# Patient Record
Sex: MALE | Race: WHITE | Employment: UNEMPLOYED | URBAN - METROPOLITAN AREA
[De-identification: names, ages, dates, MRNs, and addresses within clinical notes are randomized per-mention and may not be internally consistent; named-entity substitution may affect disease eponyms.]

---

## 2017-05-11 ENCOUNTER — ANESTHESIA EVENT (OUTPATIENT)
Dept: SURGERY | Age: 32
End: 2017-05-11
Payer: MEDICAID

## 2017-05-11 RX ORDER — ACETAMINOPHEN 500 MG
TABLET ORAL
COMMUNITY
End: 2017-05-15

## 2017-05-15 ENCOUNTER — HOSPITAL ENCOUNTER (OUTPATIENT)
Age: 32
Setting detail: OUTPATIENT SURGERY
Discharge: HOME | End: 2017-05-15
Attending: DENTIST | Admitting: DENTIST
Payer: MEDICAID

## 2017-05-15 ENCOUNTER — ANESTHESIA (OUTPATIENT)
Dept: SURGERY | Age: 32
End: 2017-05-15
Payer: MEDICAID

## 2017-05-15 VITALS
HEART RATE: 102 BPM | WEIGHT: 226 LBS | RESPIRATION RATE: 18 BRPM | DIASTOLIC BLOOD PRESSURE: 88 MMHG | TEMPERATURE: 97.4 F | HEIGHT: 69 IN | OXYGEN SATURATION: 95 % | SYSTOLIC BLOOD PRESSURE: 117 MMHG | BODY MASS INDEX: 33.47 KG/M2

## 2017-05-15 PROCEDURE — 77030020268 HC MISC GENERAL SUPPLY: Performed by: DENTIST

## 2017-05-15 PROCEDURE — 76060000032 HC ANESTHESIA 0.5 TO 1 HR: Performed by: DENTIST

## 2017-05-15 PROCEDURE — 74011250636 HC RX REV CODE- 250/636

## 2017-05-15 PROCEDURE — 74011000250 HC RX REV CODE- 250: Performed by: DENTIST

## 2017-05-15 PROCEDURE — 77030011640 HC PAD GRND REM COVD -A: Performed by: DENTIST

## 2017-05-15 PROCEDURE — 76210000006 HC OR PH I REC 0.5 TO 1 HR: Performed by: DENTIST

## 2017-05-15 PROCEDURE — 76010000138 HC OR TIME 0.5 TO 1 HR: Performed by: DENTIST

## 2017-05-15 PROCEDURE — 77030008683 HC TU ET CUF COVD -A: Performed by: ANESTHESIOLOGY

## 2017-05-15 PROCEDURE — 77030031139 HC SUT VCRL2 J&J -A: Performed by: DENTIST

## 2017-05-15 PROCEDURE — 77030032490 HC SLV COMPR SCD KNE COVD -B: Performed by: DENTIST

## 2017-05-15 PROCEDURE — 74011250637 HC RX REV CODE- 250/637: Performed by: DENTIST

## 2017-05-15 PROCEDURE — 77030018842 HC SOL IRR SOD CL 9% BAXT -A: Performed by: DENTIST

## 2017-05-15 PROCEDURE — 74011250637 HC RX REV CODE- 250/637: Performed by: NURSE ANESTHETIST, CERTIFIED REGISTERED

## 2017-05-15 PROCEDURE — 74011000250 HC RX REV CODE- 250

## 2017-05-15 PROCEDURE — 76210000021 HC REC RM PH II 0.5 TO 1 HR: Performed by: DENTIST

## 2017-05-15 PROCEDURE — 74011250636 HC RX REV CODE- 250/636: Performed by: NURSE ANESTHETIST, CERTIFIED REGISTERED

## 2017-05-15 PROCEDURE — 74011000272 HC RX REV CODE- 272: Performed by: DENTIST

## 2017-05-15 RX ORDER — CHLORHEXIDINE GLUCONATE 20 %
2 SOLUTION, NON-ORAL MISCELLANEOUS 3 TIMES DAILY
Qty: 100 ML | Refills: 1 | Status: SHIPPED | OUTPATIENT
Start: 2017-05-15

## 2017-05-15 RX ORDER — LIDOCAINE HYDROCHLORIDE 20 MG/ML
INJECTION, SOLUTION EPIDURAL; INFILTRATION; INTRACAUDAL; PERINEURAL AS NEEDED
Status: DISCONTINUED | OUTPATIENT
Start: 2017-05-15 | End: 2017-05-15 | Stop reason: HOSPADM

## 2017-05-15 RX ORDER — HYDROCODONE BITARTRATE AND ACETAMINOPHEN 7.5; 325 MG/1; MG/1
1 TABLET ORAL
Qty: 30 TAB | Refills: 0 | Status: SHIPPED | OUTPATIENT
Start: 2017-05-15

## 2017-05-15 RX ORDER — AMOXICILLIN 500 MG/1
500 CAPSULE ORAL 3 TIMES DAILY
Qty: 21 CAP | Refills: 0 | Status: SHIPPED | OUTPATIENT
Start: 2017-05-15 | End: 2017-05-22

## 2017-05-15 RX ORDER — FENTANYL CITRATE 50 UG/ML
INJECTION, SOLUTION INTRAMUSCULAR; INTRAVENOUS AS NEEDED
Status: DISCONTINUED | OUTPATIENT
Start: 2017-05-15 | End: 2017-05-15 | Stop reason: HOSPADM

## 2017-05-15 RX ORDER — PROPOFOL 10 MG/ML
INJECTION, EMULSION INTRAVENOUS AS NEEDED
Status: DISCONTINUED | OUTPATIENT
Start: 2017-05-15 | End: 2017-05-15 | Stop reason: HOSPADM

## 2017-05-15 RX ORDER — SUCCINYLCHOLINE CHLORIDE 20 MG/ML
INJECTION INTRAMUSCULAR; INTRAVENOUS AS NEEDED
Status: DISCONTINUED | OUTPATIENT
Start: 2017-05-15 | End: 2017-05-15 | Stop reason: HOSPADM

## 2017-05-15 RX ORDER — FENTANYL CITRATE 50 UG/ML
INJECTION, SOLUTION INTRAMUSCULAR; INTRAVENOUS
Status: DISCONTINUED
Start: 2017-05-15 | End: 2017-05-15 | Stop reason: HOSPADM

## 2017-05-15 RX ORDER — IBUPROFEN 200 MG
600 TABLET ORAL
Qty: 30 TAB | Refills: 0 | Status: SHIPPED | OUTPATIENT
Start: 2017-05-15

## 2017-05-15 RX ORDER — SODIUM CHLORIDE, SODIUM LACTATE, POTASSIUM CHLORIDE, CALCIUM CHLORIDE 600; 310; 30; 20 MG/100ML; MG/100ML; MG/100ML; MG/100ML
75 INJECTION, SOLUTION INTRAVENOUS CONTINUOUS
Status: DISCONTINUED | OUTPATIENT
Start: 2017-05-15 | End: 2017-05-15 | Stop reason: HOSPADM

## 2017-05-15 RX ORDER — CEFAZOLIN SODIUM IN 0.9 % NACL 2 G/100 ML
PLASTIC BAG, INJECTION (ML) INTRAVENOUS AS NEEDED
Status: DISCONTINUED | OUTPATIENT
Start: 2017-05-15 | End: 2017-05-15 | Stop reason: HOSPADM

## 2017-05-15 RX ORDER — ONDANSETRON 2 MG/ML
INJECTION INTRAMUSCULAR; INTRAVENOUS AS NEEDED
Status: DISCONTINUED | OUTPATIENT
Start: 2017-05-15 | End: 2017-05-15 | Stop reason: HOSPADM

## 2017-05-15 RX ORDER — DIPHENHYDRAMINE HYDROCHLORIDE 50 MG/ML
25 INJECTION, SOLUTION INTRAMUSCULAR; INTRAVENOUS
Status: DISCONTINUED | OUTPATIENT
Start: 2017-05-15 | End: 2017-05-15 | Stop reason: HOSPADM

## 2017-05-15 RX ORDER — LIDOCAINE HYDROCHLORIDE 10 MG/ML
0.1 INJECTION, SOLUTION EPIDURAL; INFILTRATION; INTRACAUDAL; PERINEURAL AS NEEDED
Status: DISCONTINUED | OUTPATIENT
Start: 2017-05-15 | End: 2017-05-15 | Stop reason: HOSPADM

## 2017-05-15 RX ORDER — MIDAZOLAM HYDROCHLORIDE 1 MG/ML
INJECTION, SOLUTION INTRAMUSCULAR; INTRAVENOUS AS NEEDED
Status: DISCONTINUED | OUTPATIENT
Start: 2017-05-15 | End: 2017-05-15 | Stop reason: HOSPADM

## 2017-05-15 RX ORDER — DEXAMETHASONE SODIUM PHOSPHATE 4 MG/ML
INJECTION, SOLUTION INTRA-ARTICULAR; INTRALESIONAL; INTRAMUSCULAR; INTRAVENOUS; SOFT TISSUE AS NEEDED
Status: DISCONTINUED | OUTPATIENT
Start: 2017-05-15 | End: 2017-05-15 | Stop reason: HOSPADM

## 2017-05-15 RX ORDER — IBUPROFEN 200 MG
TABLET ORAL
COMMUNITY
End: 2017-05-15

## 2017-05-15 RX ORDER — ROCURONIUM BROMIDE 10 MG/ML
INJECTION, SOLUTION INTRAVENOUS AS NEEDED
Status: DISCONTINUED | OUTPATIENT
Start: 2017-05-15 | End: 2017-05-15 | Stop reason: HOSPADM

## 2017-05-15 RX ORDER — KETOROLAC TROMETHAMINE 30 MG/ML
INJECTION, SOLUTION INTRAMUSCULAR; INTRAVENOUS AS NEEDED
Status: DISCONTINUED | OUTPATIENT
Start: 2017-05-15 | End: 2017-05-15 | Stop reason: HOSPADM

## 2017-05-15 RX ORDER — FENTANYL CITRATE 50 UG/ML
50 INJECTION, SOLUTION INTRAMUSCULAR; INTRAVENOUS AS NEEDED
Status: DISCONTINUED | OUTPATIENT
Start: 2017-05-15 | End: 2017-05-15 | Stop reason: HOSPADM

## 2017-05-15 RX ORDER — CHLORHEXIDINE GLUCONATE 1.2 MG/ML
RINSE ORAL AS NEEDED
Status: DISCONTINUED | OUTPATIENT
Start: 2017-05-15 | End: 2017-05-15 | Stop reason: HOSPADM

## 2017-05-15 RX ORDER — HYDROCODONE BITARTRATE AND ACETAMINOPHEN 5; 325 MG/1; MG/1
1 TABLET ORAL ONCE
Status: COMPLETED | OUTPATIENT
Start: 2017-05-15 | End: 2017-05-15

## 2017-05-15 RX ADMIN — KETOROLAC TROMETHAMINE 30 MG: 30 INJECTION, SOLUTION INTRAMUSCULAR; INTRAVENOUS at 10:21

## 2017-05-15 RX ADMIN — SUCCINYLCHOLINE CHLORIDE 100 MG: 20 INJECTION INTRAMUSCULAR; INTRAVENOUS at 10:11

## 2017-05-15 RX ADMIN — FENTANYL CITRATE 50 MCG: 50 INJECTION, SOLUTION INTRAMUSCULAR; INTRAVENOUS at 11:02

## 2017-05-15 RX ADMIN — ONDANSETRON 4 MG: 2 INJECTION INTRAMUSCULAR; INTRAVENOUS at 10:21

## 2017-05-15 RX ADMIN — HYDROCODONE BITARTRATE AND ACETAMINOPHEN 1 TABLET: 5; 325 TABLET ORAL at 12:14

## 2017-05-15 RX ADMIN — Medication 2 G: at 10:06

## 2017-05-15 RX ADMIN — ROCURONIUM BROMIDE 10 MG: 10 INJECTION, SOLUTION INTRAVENOUS at 10:11

## 2017-05-15 RX ADMIN — FENTANYL CITRATE 100 MCG: 50 INJECTION, SOLUTION INTRAMUSCULAR; INTRAVENOUS at 10:07

## 2017-05-15 RX ADMIN — LIDOCAINE HYDROCHLORIDE 60 MG: 20 INJECTION, SOLUTION EPIDURAL; INFILTRATION; INTRACAUDAL; PERINEURAL at 10:07

## 2017-05-15 RX ADMIN — PROPOFOL 150 MG: 10 INJECTION, EMULSION INTRAVENOUS at 10:11

## 2017-05-15 RX ADMIN — DEXAMETHASONE SODIUM PHOSPHATE 4 MG: 4 INJECTION, SOLUTION INTRA-ARTICULAR; INTRALESIONAL; INTRAMUSCULAR; INTRAVENOUS; SOFT TISSUE at 10:21

## 2017-05-15 RX ADMIN — MIDAZOLAM HYDROCHLORIDE 2 MG: 1 INJECTION, SOLUTION INTRAMUSCULAR; INTRAVENOUS at 10:02

## 2017-05-15 RX ADMIN — SODIUM CHLORIDE, SODIUM LACTATE, POTASSIUM CHLORIDE, AND CALCIUM CHLORIDE 75 ML/HR: 600; 310; 30; 20 INJECTION, SOLUTION INTRAVENOUS at 09:22

## 2017-05-15 NOTE — ANESTHESIA PREPROCEDURE EVALUATION
Anesthetic History   No history of anesthetic complications            Review of Systems / Medical History  Patient summary reviewed and pertinent labs reviewed    Pulmonary  Within defined limits                 Neuro/Psych         Psychiatric history     Cardiovascular  Within defined limits                Exercise tolerance: >4 METS     GI/Hepatic/Renal     GERD: well controlled           Endo/Other  Within defined limits           Other Findings   Comments:   Risk Factors for Postoperative nausea/vomiting:       History of postoperative nausea/vomiting? NO       Female? NO       Motion sickness? NO       Intended opioid administration for postoperative analgesia? YES      Smoking Abstinence  Current Smoker? NO  Elective Surgery? YES  Seen preoperatively by anesthesiologist or proxy prior to day of surgery? YES  Pt abstained from smoking 24 hours prior to anesthesia?  N/A           Physical Exam    Airway  Mallampati: III  TM Distance: 4 - 6 cm  Neck ROM: normal range of motion   Mouth opening: Diminished (comment)     Cardiovascular    Rhythm: regular  Rate: normal         Dental    Dentition: Poor dentition     Pulmonary  Breath sounds clear to auscultation               Abdominal  GI exam deferred       Other Findings            Anesthetic Plan    ASA: 3  Anesthesia type: general          Induction: Intravenous  Anesthetic plan and risks discussed with: Patient and Mother

## 2017-05-15 NOTE — IP AVS SNAPSHOT
[address] [address] [place] Pratima Holly 
[phone] Patient: Tahmina Marc MRN: [id number] :1985 You are allergic to the following Allergen Reactions Bee Sting (Sting, Bee) Swelling Recent Documentation Height Weight BMI Smoking Status 1.753 m 102.5 kg 33.37 kg/m2 Never Smoker Emergency Contacts Name Discharge Info Relation Home Work Mobile Klaudia Jean-Baptiste 56 CAREGIVER [3] Parent [1] [phone] About your hospitalization You were admitted on:  May 15, 2017 You last received care in the:  [facility] PACU You were discharged on:  May 15, 2017 Unit phone number:  [phone] Why you were hospitalized Your primary diagnosis was:  Not on File Providers Seen During Your Hospitalizations Provider Role Specialty Primary office phone Juan Vega DDS Attending Provider Oral Surgery [phone] Your Primary Care Physician (PCP) Primary Care Physician Office Phone Office Fax [facility] [phone] Follow-up Information Follow up With Details Comments Contact Info Klaus Torres MD   [address] [place] [address] [phone] Current Discharge Medication List  
  
START taking these medications Dose & Instructions Dispensing Information Comments Morning Noon Evening Bedtime  
 amoxicillin 500 mg capsule Commonly known as:  AMOXIL Your last dose was: Your next dose is:    
   
   
 Dose:  500 mg Take 1 Cap by mouth three (3) times daily for 7 days. Quantity:  21 Cap Refills:  0  
     
   
   
   
  
 chlorhexidine gluconate 20 % Soln liquid Commonly known as:  HIBICLENS Your last dose was: Your next dose is:    
   
   
 Dose:  2 mL  
2 mL by Irrigation route three (3) times daily.  Indications: swish for 2 mins after meals then spit. Quantity:  100 mL Refills:  1 HYDROcodone-acetaminophen 7.5-325 mg per tablet Commonly known as:  Zay Brittle Your last dose was: Your next dose is:    
   
   
 Dose:  1 Tab Take 1 Tab by mouth every six (6) hours as needed for Pain. Max Daily Amount: 4 Tabs. Quantity:  30 Tab Refills:  0 CONTINUE these medications which have CHANGED Dose & Instructions Dispensing Information Comments Morning Noon Evening Bedtime  
 ibuprofen 200 mg tablet Commonly known as:  MOTRIN What changed:   
- how much to take - when to take this 
- reasons to take this Your last dose was: Your next dose is:    
   
   
 Dose:  600 mg Take 3 Tabs by mouth every six (6) hours as needed for Pain. Quantity:  30 Tab Refills:  0 CONTINUE these medications which have NOT CHANGED Dose & Instructions Dispensing Information Comments Morning Noon Evening Bedtime MECLIZINE PO Your last dose was: Your next dose is: Take  by mouth. Refills:  0 STOP taking these medications TYLENOL EXTRA STRENGTH 500 mg tablet Generic drug:  acetaminophen Where to Get Your Medications Information on where to get these meds will be given to you by the nurse or doctor. ! Ask your nurse or doctor about these medications  
  amoxicillin 500 mg capsule  
 chlorhexidine gluconate 20 % Soln liquid HYDROcodone-acetaminophen 7.5-325 mg per tablet  
 ibuprofen 200 mg tablet Discharge Instructions Dental Surgery: What to Expect at [facility] Your Recovery Dental surgery includes procedures such as tooth extractions, root canals, gum surgery, and dental implants. Your procedure may be done by: · A dentist. 
· An oral surgeon. · An endodontist, for root canals. · A periodontist, for gum surgery. You may have some pain, bleeding, or swelling afterward, depending on the procedure. You may get medicine for pain. The pain should improve steadily after the surgery. This care sheet gives you a general idea about how long it will take for you to recover. But each person recovers at a different pace. Follow the steps below to get better as quickly as possible. How can you care for yourself at home? Activity · Allow the area to heal. Don't move quickly or lift anything heavy until you are feeling better. · Rest when you feel tired. · Your dentist may give you specific instructions on when you can do your normal activities again, such as driving and going back to work. Diet · Eat soft foods, such as gelatin, pudding, or a thin soup. Gradually add solid foods to your diet as you heal. You can eat solid foods again in about a week. · If you had a tooth pulled, don't use a straw for the first few days. Sucking on a straw can loosen the blood clot that forms at the surgery site. If this happens, it can delay healing. Medicines · Your doctor will tell you if and when you can restart your medicines. He or she will also give you instructions about taking any new medicines. · If you take blood thinners, such as warfarin (Coumadin), clopidogrel (Plavix), or aspirin, be sure to talk to your doctor. He or she will tell you if and when to start taking those medicines again. Make sure that you understand exactly what your doctor wants you to do. · Be safe with medicines. Read and follow all instructions on the label. ¨ If the dentist gave you a prescription medicine for pain, take it as prescribed. ¨ If you are not taking a prescription pain medicine, ask your dentist if you can take an over-the-counter medicine. · If your dentist prescribed antibiotics, take them as directed. Do not stop taking them just because you feel better. You need to take the full course of antibiotics. Incision care · While your mouth is numb, be careful not to bite your tongue or the inside of your cheek or lip. · If you had a tooth pulled, bite gently on a gauze pad now and then. Change the pad as it becomes soaked with blood. Call your dentist or oral surgeon if you still have bleeding 24 hours after your surgery. · If you had stitches in your gums, your dentist will tell you if and when you need to come back to have them removed. · Starting 24 hours after your tooth was pulled, gently rinse your mouth with warm salt water several times a day to reduce swelling and relieve pain. · Continue to brush your teeth and tongue carefully. Floss when your dentist says you can. Ice and heat · If needed, put ice or a cold pack on your cheek for 10 to 20 minutes at a time. Try to do this every 1 to 2 hours for the next 3 days (when you are awake) or until the swelling goes down. Put a thin cloth between the ice and your skin. Other instructions · Do not smoke for at least 24 hours after your surgery. Smoking can delay healing. Smoking also decreases the blood supply and can bring germs and contaminants to the mouth. Follow-up care is a key part of your treatment and safety. Be sure to make and go to all appointments, and call your dentist if you are having problems. It's also a good idea to know your test results and keep a list of the medicines you take. When should you call for help? Call 911 anytime you think you may need emergency care. For example, call if: 
· You passed out (lost consciousness). · You have severe trouble breathing. Call your dentist now or seek immediate medical care if: 
· You have pain that does not get better after you take pain medicine. · You have loose stitches, or your incision comes open. · You have new or more bleeding from the site. · You have signs of infection, such as: 
¨ Increased pain, swelling, warmth, or redness. ¨ Pus draining from the incision or socket. ¨ A fever. Watch closely for changes in your health, and be sure to contact your dentist if you have any problems. Where can you learn more? Go to http://jocelyn-ottoniel.info/. Enter X035 in the search box to learn more about \"Dental Surgery: What to Expect at Home. \" Current as of: August 9, 2016 Content Version: 11.2 © 1949-4871 [employer]. Care instructions adapted under license by [employer] (which disclaims liability or warranty for this information). If you have questions about a medical condition or this instruction, always ask your healthcare professional. [address] [address] any warranty or liability for your use of this information. DISCHARGE SUMMARY from Nurse The following personal items are in your possession at time of discharge: 
 
Dental Appliances: None Visual Aid: Glasses Jewelry: None Clothing: Shirt, Socks, Footwear, Undergarments, Pants Other Valuables: Eyeglasses (everything given to mom) PATIENT INSTRUCTIONS: 
 
 
F-face looks uneven A-arms unable to move or move unevenly S-speech slurred or non-existent T-time-call 911 as soon as signs and symptoms begin-DO NOT go Back to bed or wait to see if you get better-TIME IS BRAIN. Warning Signs of HEART ATTACK Call 911 if you have these symptoms: 
? Chest discomfort. Most heart attacks involve discomfort in the center of the chest that lasts more than a few minutes, or that goes away and comes back. It can feel like uncomfortable pressure, squeezing, fullness, or pain. ? Discomfort in other areas of the upper body. Symptoms can include pain or discomfort in one or both arms, the back, neck, jaw, or stomach. ? Shortness of breath with or without chest discomfort. ? Other signs may include breaking out in a cold sweat, nausea, or lightheadedness. Don't wait more than five minutes to call [address]! Fast action can save your life. Calling 911 is almost always the fastest way to get lifesaving treatment. Emergency Medical Services staff can begin treatment when they arrive  up to an hour sooner than if someone gets to the hospital by car. The discharge information has been reviewed with the patient and parent. The patient and parent verbalized understanding. Discharge medications reviewed with the patient and parent and appropriate educational materials and side effects teaching were provided. Patient armband removed and given to patient to take home. Patient was informed of the privacy risks if armband lost or stolen Discharge Orders None Introducing \[facility]\"" & [facility] SERVICES! Shadi Nassar introduces [employer] patient portal. Now you can access parts of your medical record, email your doctor's office, and request medication refills online. 1. In your internet browser, go to [email or web address] 2. Click on the First Time User? Click Here link in the Sign In box. You will see the New Member Sign Up page. 3. Enter your MongoDB Access Code exactly as it appears below. You will not need to use this code after youve completed the sign-up process. If you do not sign up before the expiration date, you must request a new code. · [employer] Access Code: [email or web address] Expires: 8/13/2017  7:03 AM 
 
4. Enter the last four digits of your Social Security Number (xxxx) and Date of Birth (mm/dd/yyyy) as indicated and click Submit. You will be taken to the next sign-up page. 5. Create a Bolt.iot ID. This will be your MongoDB login ID and cannot be changed, so think of one that is secure and easy to remember. 6. Create a Bolt.iot password. You can change your password at any time. 7. Enter your Password Reset Question and Answer. This can be used at a later time if you forget your password. 8. Enter your e-mail address. You will receive e-mail notification when new information is available in [address] 9. Click Sign Up. You can now view and download portions of your medical record. 10. Click the Download Summary menu link to download a portable copy of your medical information. If you have questions, please visit the Frequently Asked Questions section of the Watsin website. Remember, W[employer]in is NOT to be used for urgent needs. For medical emergencies, dial 911. Now available from your iPhone and Android! General Information Please provide this summary of care documentation to your next provider. Patient Signature:  ____________________________________________________________ Date:  ____________________________________________________________  
  
[facility] Provider Signature:  ____________________________________________________________ Date:  ____________________________________________________________

## 2017-05-15 NOTE — ANESTHESIA POSTPROCEDURE EVALUATION
Post-Anesthesia Evaluation and Assessment    Patient: Yayo Ortiz MRN: [id number]  SSN: [id number]    YOB: 1985  Age: 34 y.o. Sex: male      Data from PACU flowsheet    Cardiovascular Function/Vital Signs  Visit Vitals    BP (!) 134/94    Pulse (!) 108    Temp 36.3 °C (97.4 °F)    Resp 21    Ht 5' 9\" (1.753 m)    Wt 102.5 kg (226 lb)    SpO2 92%    BMI 33.37 kg/m2       Patient is status post general anesthesia for Procedure(s):  extract 5,32,98,58. Nausea/Vomiting: controlled    Postoperative hydration reviewed and adequate. Pain:  Pain Scale 1: Numeric (0 - 10) (05/15/17 1113)  Pain Intensity 1: 0 (05/15/17 1113)   Managed      Mental Status and Level of Consciousness: Alert and oriented     Pulmonary Status:   O2 Device: Room air (05/15/17 1102)   Adequate oxygenation and airway patent    Complications related to anesthesia: None    Post-anesthesia assessment completed.  No concerns    Signed By: Sandeep Hope CRNA     May 15, 2017

## 2017-05-15 NOTE — H&P
H&p reviewed. Pt examined. No changes. Planning for 4 extractions under GA. Consent signed.  Dr Jose R Lanza

## 2017-05-15 NOTE — DISCHARGE INSTRUCTIONS
Dental Surgery: What to Expect at [address] [place] [address] surgery includes procedures such as tooth extractions, root canals, gum surgery, and dental implants. Your procedure may be done by:  · A dentist.  · An oral surgeon. · An endodontist, for root canals. · A periodontist, for gum surgery. You may have some pain, bleeding, or swelling afterward, depending on the procedure. You may get medicine for pain. The pain should improve steadily after the surgery. This care sheet gives you a general idea about how long it will take for you to recover. But each person recovers at a different pace. Follow the steps below to get better as quickly as possible. How can you care for yourself at home? Activity  · Allow the area to heal. Don't move quickly or lift anything heavy until you are feeling better. · Rest when you feel tired. · Your dentist may give you specific instructions on when you can do your normal activities again, such as driving and going back to work. Diet  · Eat soft foods, such as gelatin, pudding, or a thin soup. Gradually add solid foods to your diet as you heal. You can eat solid foods again in about a week. · If you had a tooth pulled, don't use a straw for the first few days. Sucking on a straw can loosen the blood clot that forms at the surgery site. If this happens, it can delay healing. Medicines  · Your doctor will tell you if and when you can restart your medicines. He or she will also give you instructions about taking any new medicines. · If you take blood thinners, such as warfarin (Coumadin), clopidogrel (Plavix), or aspirin, be sure to talk to your doctor. He or she will tell you if and when to start taking those medicines again. Make sure that you understand exactly what your doctor wants you to do. · Be safe with medicines. Read and follow all instructions on the label. ¨ If the dentist gave you a prescription medicine for pain, take it as prescribed.   ¨ If you are not taking a prescription pain medicine, ask your dentist if you can take an over-the-counter medicine. · If your dentist prescribed antibiotics, take them as directed. Do not stop taking them just because you feel better. You need to take the full course of antibiotics. Incision care  · While your mouth is numb, be careful not to bite your tongue or the inside of your cheek or lip. · If you had a tooth pulled, bite gently on a gauze pad now and then. Change the pad as it becomes soaked with blood. Call your dentist or oral surgeon if you still have bleeding 24 hours after your surgery. · If you had stitches in your gums, your dentist will tell you if and when you need to come back to have them removed. · Starting 24 hours after your tooth was pulled, gently rinse your mouth with warm salt water several times a day to reduce swelling and relieve pain. · Continue to brush your teeth and tongue carefully. Floss when your dentist says you can. Ice and heat  · If needed, put ice or a cold pack on your cheek for 10 to 20 minutes at a time. Try to do this every 1 to 2 hours for the next 3 days (when you are awake) or until the swelling goes down. Put a thin cloth between the ice and your skin. Other instructions  · Do not smoke for at least 24 hours after your surgery. Smoking can delay healing. Smoking also decreases the blood supply and can bring germs and contaminants to the mouth. Follow-up care is a key part of your treatment and safety. Be sure to make and go to all appointments, and call your dentist if you are having problems. It's also a good idea to know your test results and keep a list of the medicines you take. When should you call for help? Call 911 anytime you think you may need emergency care. For example, call if:  · You passed out (lost consciousness). · You have severe trouble breathing.   Call your dentist now or seek immediate medical care if:  · You have pain that does not get better after you take pain medicine. · You have loose stitches, or your incision comes open. · You have new or more bleeding from the site. · You have signs of infection, such as:  ¨ Increased pain, swelling, warmth, or redness. ¨ Pus draining from the incision or socket. ¨ A fever. Watch closely for changes in your health, and be sure to contact your dentist if you have any problems. Where can you learn more? Go to http://jocelyn-ottoniel.info/. Enter T576 in the search box to learn more about \"Dental Surgery: What to Expect at Home. \"  Current as of: August 9, 2016  Content Version: 11.2  © 8566-5764 [employer]. Care instructions adapted under license by [employer] (which disclaims liability or warranty for this information). If you have questions about a medical condition or this instruction, always ask your healthcare professional. [address] [address] any warranty or liability for your use of this information. DISCHARGE SUMMARY from Nurse    The following personal items are in your possession at time of discharge:    Dental Appliances: None  Visual Aid: Glasses        Jewelry: None  Clothing: Shirt, Socks, Footwear, Undergarments, Pants  Other Valuables: Eyeglasses (everything given to mom)             PATIENT INSTRUCTIONS:    After general anesthesia or intravenous sedation, for 24 hours or while taking prescription Narcotics:  · Limit your activities  · Do not drive and operate hazardous machinery  · Do not make important personal or business decisions  · Do  not drink alcoholic beverages  · If you have not urinated within 8 hours after discharge, please contact your surgeon on call.     Report the following to your surgeon:  · Excessive pain, swelling, redness or odor of or around the surgical area  · Temperature over 100.5  · Nausea and vomiting lasting longer than 4 hours or if unable to take medications  · Any signs of decreased circulation or nerve impairment to extremity: change in color, persistent  numbness, tingling, coldness or increase pain  · Any questions        What to do at Home:  Recommended activity: Activity as tolerated and no driving for today. *  Please give a list of your current medications to your Primary Care Provider. *  Please update this list whenever your medications are discontinued, doses are      changed, or new medications (including over-the-counter products) are added. *  Please carry medication information at all times in case of emergency situations. These are general instructions for a healthy lifestyle:    No smoking/ No tobacco products/ Avoid exposure to second hand smoke    Surgeon General's Warning:  Quitting smoking now greatly reduces serious risk to your health. Obesity, smoking, and sedentary lifestyle greatly increases your risk for illness    A healthy diet, regular physical exercise & weight monitoring are important for maintaining a healthy lifestyle    You may be retaining fluid if you have a history of heart failure or if you experience any of the following symptoms:  Weight gain of 3 pounds or more overnight or 5 pounds in a week, increased swelling in our hands or feet or shortness of breath while lying flat in bed. Please call your doctor as soon as you notice any of these symptoms; do not wait until your next office visit. Recognize signs and symptoms of STROKE:    F-face looks uneven    A-arms unable to move or move unevenly    S-speech slurred or non-existent    T-time-call 911 as soon as signs and symptoms begin-DO NOT go       Back to bed or wait to see if you get better-TIME IS BRAIN. Warning Signs of HEART ATTACK     Call 911 if you have these symptoms:   Chest discomfort. Most heart attacks involve discomfort in the center of the chest that lasts more than a few minutes, or that goes away and comes back.  It can feel like uncomfortable pressure, squeezing, fullness, or pain.   Discomfort in other areas of the upper body. Symptoms can include pain or discomfort in one or both arms, the back, neck, jaw, or stomach.  Shortness of breath with or without chest discomfort.  Other signs may include breaking out in a cold sweat, nausea, or lightheadedness. Don't wait more than five minutes to call 911 - MINUTES MATTER! Fast action can save your life. Calling 911 is almost always the fastest way to get lifesaving treatment. Emergency Medical Services staff can begin treatment when they arrive -- up to an hour sooner than if someone gets to the hospital by car. The discharge information has been reviewed with the patient and parent. The patient and parent verbalized understanding. Discharge medications reviewed with the patient and parent and appropriate educational materials and side effects teaching were provided. Patient armband removed and given to patient to take home.   Patient was informed of the privacy risks if armband lost or stolen

## 2017-05-15 NOTE — PERIOP NOTES
1052  Received pt. Connected pt to monitor. VSS. Assessment preformed. RN at bedside. Will continue to monitor. [facility] to waiting room, spoke to [facility] - pt mother - pt id number verified. Update given on pt status.

## 2017-05-15 NOTE — OP NOTES
OPERATIVE REPORT      Date:  May 15, 2017'          Patient:  Boo Mora        Surgeon :  Meron Tran DDS    Assistant: Ernestina Kate    Location: [facility]    Preoerative Diagnosis:  Dental caries, necrotic tooth    Postoperative diagnosis: same    Procedure:  SUrgical Extraction of Teeth # 12 13 20  Extraction of root tip #6    Indications:    33 yo male w refferal from [facility]. Ho MR & unable to tolerate procedure in office. Consent confirmed signed and tooth numbers confirmed with patient. PATIENT STILL SYMPTOMATIC, Escort Present. Consent previously signed. All questions and concerns addressed. Procedure:    Nasotracheal tube was inserted & secured by anesthesia. The patient was prepped and draped in a sterile fashion. A  Throat packing was placed. There was no evidendence of impingement on the patients lips with mouth prop. Local anesthesia was infiltrated into bilateral maxillary vesitibules, palate, and bilateral mandibular blocks. Care was taken to confirm that an intravascular injection was avoided. - Tooth # 6. A 15 blade was used for sulcular incision with a distal buccal  release. Periostium reflected. Overlying bone (superior/buccal) was removed with a #6 round bur. At all times copius irrigation was used during the removal of bone and tooth sectioning. The teeth were vertically sectioned with a #6 round bur and a straight elevator. Note: During sectioning of the lower teeth the lingual bone was perserved and the bur never violated the lingual tissue. At all times care was taken to use a buccal surgical approach to expose and section the tooth. An atraumatic extraction of the tooth and/or roots was performed with a universal forceps. All sharp edges were smooth with a ronguer forceps. The site was irrigated with Normal Saline Irrigation. Site curretaged and irrigated. Care taken when curretage apical portion of socket.  All sharp areas were smoothed w ronguer & bone file. 3.0 vicryl suture for primary closure of gingiva.      - Tooth #12. A 15 blade was used for sulcular incision with a distal buccal  release. Periostium reflected. Overlying bone (superior/buccal) was removed with a #6 round bur. At all times copius irrigation was used during the removal of bone and tooth sectioning. The teeth were vertically sectioned with a #6 round bur and a straight elevator. Note: During sectioning of the lower teeth the lingual bone was perserved and the bur never violated the lingual tissue. At all times care was taken to use a buccal surgical approach to expose and section the tooth. An atraumatic extraction of the tooth and/or roots was performed with a universal forceps. All sharp edges were smooth with a ronguer forceps. The site was irrigated with Normal Saline Irrigation. Site curretaged and irrigated. Care taken when curretage apical portion of socket. All sharp areas were smoothed w ronguer & bone file. 3.0 vicryl suture for primary closure of gingiva.      - Tooth #13. A 15 blade was used for sulcular incision with a distal buccal  release. Periostium reflected. Overlying bone (superior/buccal) was removed with a #6 round bur. At all times copius irrigation was used during the removal of bone and tooth sectioning. The teeth were vertically sectioned with a #6 round bur and a straight elevator. Note: During sectioning of the lower teeth the lingual bone was perserved and the bur never violated the lingual tissue. At all times care was taken to use a buccal surgical approach to expose and section the tooth. An atraumatic extraction of the tooth and/or roots was performed with a universal forceps. All sharp edges were smooth with a ronguer forceps. The site was irrigated with Normal Saline Irrigation. Site curretaged and irrigated. Care taken when curretage apical portion of socket. All sharp areas were smoothed w ronguer & bone file.  3.0 vicryl suture for primary closure of gingiva.      - Tooth # 20   A 15 blade was used for sulcular incision with a distal buccal  release. Periostium reflected. Overlying bone (superior/buccal) was removed with a #6 round bur. At all times copius irrigation was used during the removal of bone and tooth sectioning. The teeth were vertically sectioned with a #6 round bur and a straight elevator. Note: During sectioning of the lower teeth the lingual bone was perserved and the bur never violated the lingual tissue. At all times care was taken to use a buccal surgical approach to expose and section the tooth. An atraumatic extraction of the tooth and/or roots was performed with a universal forceps. All sharp edges were smooth with a ronguer forceps. The site was irrigated with Normal Saline Irrigation. Site curretaged and irrigated. Care taken when curretage apical portion of socket. All sharp areas were smoothed w ronguer & bone file. 3.0 vicryl suture for primary closure of gingiva. Hemostasis had been achieved. Throat was clear of any debri. Throat packing was removed. The patient was taken to recovery & plan to DC home later today.      Local anesthesia:  5cc  2% Lidocaine w epi    Blood loss: minimal    Complications: None

## 2025-05-14 ENCOUNTER — OFFICE VISIT (OUTPATIENT)
Facility: CLINIC | Age: 40
End: 2025-05-14
Payer: MEDICARE

## 2025-05-14 ENCOUNTER — HOSPITAL ENCOUNTER (OUTPATIENT)
Facility: HOSPITAL | Age: 40
Setting detail: SPECIMEN
Discharge: HOME | End: 2025-05-17

## 2025-05-14 VITALS
OXYGEN SATURATION: 92 % | TEMPERATURE: 98.4 F | WEIGHT: 240 LBS | HEIGHT: 69 IN | DIASTOLIC BLOOD PRESSURE: 73 MMHG | RESPIRATION RATE: 16 BRPM | SYSTOLIC BLOOD PRESSURE: 108 MMHG | HEART RATE: 85 BPM | BODY MASS INDEX: 35.55 KG/M2

## 2025-05-14 DIAGNOSIS — K21.9 GASTRO-ESOPHAGEAL REFLUX DISEASE WITHOUT ESOPHAGITIS: ICD-10-CM

## 2025-05-14 DIAGNOSIS — G60.0 HEREDITARY MOTOR AND SENSORY NEUROPATHY: ICD-10-CM

## 2025-05-14 DIAGNOSIS — R73.01 IFG (IMPAIRED FASTING GLUCOSE): ICD-10-CM

## 2025-05-14 DIAGNOSIS — E66.01 CLASS 2 SEVERE OBESITY DUE TO EXCESS CALORIES WITH SERIOUS COMORBIDITY AND BODY MASS INDEX (BMI) OF 35.0 TO 35.9 IN ADULT (HCC): ICD-10-CM

## 2025-05-14 DIAGNOSIS — I70.0 ATHEROSCLEROSIS OF AORTA: ICD-10-CM

## 2025-05-14 DIAGNOSIS — G60.0 CMT (CHARCOT-MARIE-TOOTH DISEASE): Primary | ICD-10-CM

## 2025-05-14 DIAGNOSIS — E66.812 CLASS 2 SEVERE OBESITY DUE TO EXCESS CALORIES WITH SERIOUS COMORBIDITY AND BODY MASS INDEX (BMI) OF 35.0 TO 35.9 IN ADULT (HCC): ICD-10-CM

## 2025-05-14 DIAGNOSIS — Z76.89 ENCOUNTER TO ESTABLISH CARE: ICD-10-CM

## 2025-05-14 DIAGNOSIS — R60.0 PERIPHERAL EDEMA: ICD-10-CM

## 2025-05-14 DIAGNOSIS — L30.4 INTERTRIGINOUS DERMATITIS ASSOCIATED WITH MOISTURE: ICD-10-CM

## 2025-05-14 DIAGNOSIS — I10 PRIMARY HYPERTENSION: ICD-10-CM

## 2025-05-14 DIAGNOSIS — E78.00 PURE HYPERCHOLESTEROLEMIA, UNSPECIFIED: ICD-10-CM

## 2025-05-14 PROBLEM — M54.9 BACKACHE: Status: ACTIVE | Noted: 2023-04-13

## 2025-05-14 PROBLEM — K08.9 DISORDER OF TEETH AND SUPPORTING STRUCTURES: Status: ACTIVE | Noted: 2023-04-13

## 2025-05-14 PROBLEM — J98.8 BACTERIAL RESPIRATORY INFECTION: Status: RESOLVED | Noted: 2025-02-21 | Resolved: 2025-05-14

## 2025-05-14 PROBLEM — M54.9 BACKACHE: Status: RESOLVED | Noted: 2023-04-13 | Resolved: 2025-05-14

## 2025-05-14 PROBLEM — M62.81 MUSCLE WEAKNESS: Status: ACTIVE | Noted: 2023-03-31

## 2025-05-14 PROBLEM — J98.8 BACTERIAL RESPIRATORY INFECTION: Status: ACTIVE | Noted: 2025-02-21

## 2025-05-14 PROBLEM — R19.7 DIARRHEA: Status: ACTIVE | Noted: 2024-01-03

## 2025-05-14 PROBLEM — Z74.1 NEED FOR ASSISTANCE WITH PERSONAL CARE: Status: ACTIVE | Noted: 2025-04-02

## 2025-05-14 PROBLEM — L30.9 DERMATITIS: Status: ACTIVE | Noted: 2024-01-03

## 2025-05-14 PROBLEM — R21 RASH: Status: ACTIVE | Noted: 2024-03-07

## 2025-05-14 PROBLEM — R05.3 CHRONIC COUGH: Status: ACTIVE | Noted: 2025-03-17

## 2025-05-14 PROBLEM — F79 UNSPECIFIED INTELLECTUAL DISABILITIES: Status: ACTIVE | Noted: 2025-03-17

## 2025-05-14 PROBLEM — M54.41 MIDLINE LOW BACK PAIN WITH RIGHT-SIDED SCIATICA: Status: ACTIVE | Noted: 2023-04-10

## 2025-05-14 PROBLEM — B96.89 OTHER SPECIFIED BACTERIAL AGENTS AS THE CAUSE OF DISEASES CLASSIFIED ELSEWHERE: Status: RESOLVED | Noted: 2025-02-21 | Resolved: 2025-05-14

## 2025-05-14 PROBLEM — R07.81 RIB PAIN: Status: ACTIVE | Noted: 2024-10-30

## 2025-05-14 PROBLEM — B96.89 BACTERIAL RESPIRATORY INFECTION: Status: ACTIVE | Noted: 2025-02-21

## 2025-05-14 PROBLEM — K44.9 DIAPHRAGMATIC HERNIA WITHOUT OBSTRUCTION OR GANGRENE: Status: ACTIVE | Noted: 2025-04-01

## 2025-05-14 PROBLEM — M25.572 LEFT ANKLE PAIN: Status: ACTIVE | Noted: 2023-05-17

## 2025-05-14 PROBLEM — E66.09 OTHER OBESITY DUE TO EXCESS CALORIES: Status: ACTIVE | Noted: 2023-10-16

## 2025-05-14 PROBLEM — K59.00 CONSTIPATION: Status: ACTIVE | Noted: 2023-03-31

## 2025-05-14 PROBLEM — B96.89 OTHER SPECIFIED BACTERIAL AGENTS AS THE CAUSE OF DISEASES CLASSIFIED ELSEWHERE: Status: ACTIVE | Noted: 2025-02-21

## 2025-05-14 PROBLEM — L60.0 INGROWN TOENAIL: Status: ACTIVE | Noted: 2024-10-30

## 2025-05-14 PROBLEM — B96.89 BACTERIAL RESPIRATORY INFECTION: Status: RESOLVED | Noted: 2025-02-21 | Resolved: 2025-05-14

## 2025-05-14 PROBLEM — Z91.81 HISTORY OF FALLING: Status: ACTIVE | Noted: 2024-05-29

## 2025-05-14 PROBLEM — Z90.49 S/P CHOLECYSTECTOMY: Status: ACTIVE | Noted: 2024-04-24

## 2025-05-14 PROBLEM — H92.09 OTALGIA: Status: ACTIVE | Noted: 2024-08-01

## 2025-05-14 PROBLEM — K81.9 CHOLECYSTITIS: Status: ACTIVE | Noted: 2023-11-01

## 2025-05-14 PROBLEM — R60.9 EDEMA: Status: ACTIVE | Noted: 2024-08-01

## 2025-05-14 PROBLEM — S92.314S: Status: ACTIVE | Noted: 2025-04-01

## 2025-05-14 PROBLEM — U07.1 COVID: Status: ACTIVE | Noted: 2024-03-07

## 2025-05-14 PROBLEM — R26.9 GAIT ABNORMALITY: Status: ACTIVE | Noted: 2023-05-17

## 2025-05-14 PROBLEM — U07.1 COVID: Status: RESOLVED | Noted: 2024-03-07 | Resolved: 2025-05-14

## 2025-05-14 PROBLEM — G89.29 OTHER CHRONIC PAIN: Status: ACTIVE | Noted: 2023-04-13

## 2025-05-14 LAB — SENTARA SPECIMEN COLLECTION: NORMAL

## 2025-05-14 PROCEDURE — 99417 PROLNG OP E/M EACH 15 MIN: CPT

## 2025-05-14 PROCEDURE — 99001 SPECIMEN HANDLING PT-LAB: CPT

## 2025-05-14 PROCEDURE — 99205 OFFICE O/P NEW HI 60 MIN: CPT

## 2025-05-14 PROCEDURE — 3078F DIAST BP <80 MM HG: CPT

## 2025-05-14 PROCEDURE — 3074F SYST BP LT 130 MM HG: CPT

## 2025-05-14 RX ORDER — NYSTATIN 10B UNIT
1 POWDER (EA) MISCELLANEOUS 2 TIMES DAILY
COMMUNITY
End: 2025-05-14 | Stop reason: ALTCHOICE

## 2025-05-14 RX ORDER — NYSTATIN 100000 [USP'U]/G
POWDER TOPICAL
Qty: 60 G | Refills: 3 | Status: SHIPPED | OUTPATIENT
Start: 2025-05-14

## 2025-05-14 RX ORDER — TRIAMCINOLONE ACETONIDE 1 MG/G
CREAM TOPICAL DAILY PRN
COMMUNITY
Start: 2025-04-21

## 2025-05-14 RX ORDER — IBUPROFEN 200 MG
200 TABLET ORAL 2 TIMES DAILY PRN
COMMUNITY

## 2025-05-14 RX ORDER — MECLIZINE HCL 25MG 25 MG/1
25-50 TABLET, CHEWABLE ORAL DAILY PRN
COMMUNITY
End: 2025-05-14 | Stop reason: ALTCHOICE

## 2025-05-14 RX ORDER — CHOLECALCIFEROL (VITAMIN D3) 25 MCG
2 TABLET,CHEWABLE ORAL DAILY
COMMUNITY

## 2025-05-14 RX ORDER — HYDROCHLOROTHIAZIDE 12.5 MG/1
CAPSULE ORAL
COMMUNITY
Start: 2024-08-01 | End: 2025-07-26

## 2025-05-14 RX ORDER — PSEUDOEPHED/ACETAMINOPH/DIPHEN 30MG-500MG
TABLET ORAL
COMMUNITY
Start: 2025-05-03

## 2025-05-14 RX ORDER — LOSARTAN POTASSIUM 50 MG/1
50 TABLET ORAL DAILY
COMMUNITY
Start: 2025-03-18

## 2025-05-14 RX ORDER — MAGNESIUM HYDROXIDE/ALUMINUM HYDROXICE/SIMETHICONE 120; 1200; 1200 MG/30ML; MG/30ML; MG/30ML
15 SUSPENSION ORAL PRN
COMMUNITY

## 2025-05-14 RX ORDER — FAMOTIDINE 20 MG/1
20 TABLET, FILM COATED ORAL DAILY
COMMUNITY
Start: 2024-03-25 | End: 2025-06-17

## 2025-05-14 SDOH — ECONOMIC STABILITY: FOOD INSECURITY: WITHIN THE PAST 12 MONTHS, THE FOOD YOU BOUGHT JUST DIDN'T LAST AND YOU DIDN'T HAVE MONEY TO GET MORE.: NEVER TRUE

## 2025-05-14 SDOH — ECONOMIC STABILITY: FOOD INSECURITY: WITHIN THE PAST 12 MONTHS, YOU WORRIED THAT YOUR FOOD WOULD RUN OUT BEFORE YOU GOT MONEY TO BUY MORE.: NEVER TRUE

## 2025-05-14 ASSESSMENT — PATIENT HEALTH QUESTIONNAIRE - PHQ9
SUM OF ALL RESPONSES TO PHQ QUESTIONS 1-9: 0
SUM OF ALL RESPONSES TO PHQ QUESTIONS 1-9: 0
2. FEELING DOWN, DEPRESSED OR HOPELESS: NOT AT ALL
1. LITTLE INTEREST OR PLEASURE IN DOING THINGS: NOT AT ALL
SUM OF ALL RESPONSES TO PHQ QUESTIONS 1-9: 0
SUM OF ALL RESPONSES TO PHQ QUESTIONS 1-9: 0

## 2025-05-14 ASSESSMENT — ENCOUNTER SYMPTOMS
SHORTNESS OF BREATH: 0
BACK PAIN: 0
BLOOD IN STOOL: 0
WHEEZING: 0
COUGH: 0

## 2025-05-14 NOTE — PROGRESS NOTES
Have you been to the ER, urgent care clinic since your last visit?  Hospitalized since your last visit?   Yes 3-29-25 [facility] Closed non displaced fracture of metatarsal in right hand    Have you seen or consulted any other health care providers outside our system since your last visit?   NO

## 2025-05-14 NOTE — PROGRESS NOTES
Mc Weber is a 40 y.o.  male is seen on 5/14/2025 for Establish Care      Assessment & Plan:     1. CMT (Charcot-Rocio-Tooth disease)  Assessment & Plan:   Chronic, at goal (stable), previously managed by Neurology specialist, lost to follow up. New referral sent. Fup in 1 month  Orders:  -     CBC with Auto Differential; Future  -     Comprehensive Metabolic Panel; Future  -     Urinalysis with Microscopic; Future  -     External Referral To Neurology  2. Class 2 severe obesity due to excess calories with serious comorbidity and body mass index (BMI) of 35.0 to 35.9 in adult (HCC)  Assessment & Plan:   Chronic, not at goal (unstable), lifestyle modifications recommended  Orders:  -     CBC with Auto Differential; Future  -     Comprehensive Metabolic Panel; Future  3. Primary hypertension  Assessment & Plan:   Chronic, at goal (stable), bp at goal of < 130/80. Continue losartan 50 mg daily. Fup in 1 month  Orders:  -     CBC with Auto Differential; Future  -     Comprehensive Metabolic Panel; Future  -     Urinalysis with Microscopic; Future  4. Hereditary motor and sensory neuropathy  Assessment & Plan:   Monitored by specialist- no acute findings meriting change in the plan- referred to neurology.  Orders:  -     CBC with Auto Differential; Future  -     Comprehensive Metabolic Panel; Future  5. Pure hypercholesterolemia, unspecified  Assessment & Plan:    Chronic, unsure if stable. Lipid panel ordered today, fup in 1 month. Consider statin if ldl > 100 given atherosclerosis of aorta diagnosis.   Orders:  -     CBC with Auto Differential; Future  -     Comprehensive Metabolic Panel; Future  -     Lipid Panel; Future  6. Peripheral edema  Assessment & Plan:   Chronic, not at goal (unstable), encouraged daily compression socks and daily HCTZ 12.5 mg daily. Fup in 1 month  Orders:  -     CBC with Auto Differential; Future  -     Comprehensive Metabolic Panel; Future  7. Gastro-esophageal reflux disease without

## 2025-05-15 NOTE — ASSESSMENT & PLAN NOTE
Chronic, unsure if stable. Lipid panel ordered today, fup in 1 month. Consider statin if ldl > 100 given atherosclerosis of aorta diagnosis.

## 2025-05-15 NOTE — ASSESSMENT & PLAN NOTE
Chronic, at goal (stable), managed with famotidine 20 mg daily and as needed tums. Fup in 1 month.

## 2025-05-15 NOTE — ASSESSMENT & PLAN NOTE
Chronic, not at goal (unstable), encouraged daily compression socks and daily HCTZ 12.5 mg daily. Fup in 1 month

## 2025-05-15 NOTE — ASSESSMENT & PLAN NOTE
Chronic, at goal (stable), previously managed by Neurology specialist, lost to follow up. New referral sent. Fup in 1 month

## 2025-05-15 NOTE — ASSESSMENT & PLAN NOTE
Chronic, previously diagnosed. Lipid panel ordered for primary prevention of ASCVD. Fup in 1 month. Consider asa.

## 2025-06-09 NOTE — PROGRESS NOTES
[facility]  [address]  [address]  [place] [address]  [phone]      Chief Complaint   Patient presents with    Oral Pain     Possible abscess        Assessment & Plan:     Assessment & Plan  Tooth abscess   New, not at goal (unstable), augmentin 1 tab twice daily for 10 days ordered. Instructed patient and mother to call insurance provider to ask for dentist that is in network. Fup prn    Orders:    amoxicillin-clavulanate (AUGMENTIN) 875-125 MG per tablet; Take 1 tablet by mouth 2 times daily for 10 days      Follow-up and Dispositions    Return in 17 days (on 6/27/2025) for MWV, 30 MINS.         Subjective:     HPI      Mc Weber is a 40 y.o.  male is here today for a follow up on Oral Pain (Possible abscess )     History of Present Illness  The patient is a 40-year-old male who presents for a possible abscess. He is accompanied by his mother.    He has been experiencing dental pain for the past 3 weeks, localized to the right side of his mouth. The pain occasionally radiates to the temporal area. He reports no systemic symptoms such as fevers, chills, or night sweats. His last dental examination was conducted before COVID, approximately in 2019. He has a history of recurrent canker sores, which were initially suspected to be the cause of his current discomfort. However, the persistence of the pain has led to the consideration of an abscess. He has a known history of abscesses, which have previously responded to antibiotic therapy. It is noteworthy that he exhibits a strong gag reflex during dental procedures. He has not experienced any adverse reactions to Augmentin or amoxicillin.    FAMILY HISTORY  He inherited getting canker sores from his father.       Labs obtained prior to visit? N/A  Reviewed with patient? N/A    ROS  Review of Systems   Constitutional:  Negative for activity change, appetite change, chills, diaphoresis, fatigue and fever.   HENT:  Positive for dental

## 2025-06-10 ENCOUNTER — OFFICE VISIT (OUTPATIENT)
Facility: CLINIC | Age: 40
End: 2025-06-10
Payer: MEDICARE

## 2025-06-10 VITALS
DIASTOLIC BLOOD PRESSURE: 74 MMHG | TEMPERATURE: 98 F | BODY MASS INDEX: 35.44 KG/M2 | HEART RATE: 85 BPM | RESPIRATION RATE: 16 BRPM | HEIGHT: 69 IN | SYSTOLIC BLOOD PRESSURE: 107 MMHG | OXYGEN SATURATION: 93 %

## 2025-06-10 DIAGNOSIS — K04.7 TOOTH ABSCESS: Primary | ICD-10-CM

## 2025-06-10 PROCEDURE — 3074F SYST BP LT 130 MM HG: CPT

## 2025-06-10 PROCEDURE — 99214 OFFICE O/P EST MOD 30 MIN: CPT

## 2025-06-10 PROCEDURE — 3078F DIAST BP <80 MM HG: CPT

## 2025-06-10 ASSESSMENT — ENCOUNTER SYMPTOMS
COUGH: 0
SINUS PAIN: 0
SINUS PRESSURE: 0
SHORTNESS OF BREATH: 0
SORE THROAT: 0

## 2025-06-10 NOTE — PROGRESS NOTES
Have you been to the ER, urgent care clinic since your last visit?  Hospitalized since your last visit?   NO    Have you seen or consulted any other health care providers outside our system since your last visit?   Yes- Podiatry came to their house to trim his nails

## 2025-06-10 NOTE — ASSESSMENT & PLAN NOTE
New, not at goal (unstable), augmentin 1 tab twice daily for 10 days ordered. Instructed patient and mother to call insurance provider to ask for dentist that is in network. Fup prn    Orders:    amoxicillin-clavulanate (AUGMENTIN) 875-125 MG per tablet; Take 1 tablet by mouth 2 times daily for 10 days

## 2025-06-17 ENCOUNTER — TELEPHONE (OUTPATIENT)
Facility: CLINIC | Age: 40
End: 2025-06-17

## 2025-06-18 ENCOUNTER — TELEPHONE (OUTPATIENT)
Facility: CLINIC | Age: 40
End: 2025-06-18

## 2025-07-30 ENCOUNTER — OFFICE VISIT (OUTPATIENT)
Facility: CLINIC | Age: 40
End: 2025-07-30
Payer: MEDICARE

## 2025-07-30 VITALS
SYSTOLIC BLOOD PRESSURE: 112 MMHG | BODY MASS INDEX: 35.34 KG/M2 | TEMPERATURE: 98.3 F | HEART RATE: 95 BPM | OXYGEN SATURATION: 92 % | HEIGHT: 69 IN | DIASTOLIC BLOOD PRESSURE: 68 MMHG | WEIGHT: 238.6 LBS | RESPIRATION RATE: 16 BRPM

## 2025-07-30 DIAGNOSIS — E66.01 CLASS 2 SEVERE OBESITY DUE TO EXCESS CALORIES WITH SERIOUS COMORBIDITY AND BODY MASS INDEX (BMI) OF 35.0 TO 35.9 IN ADULT (HCC): ICD-10-CM

## 2025-07-30 DIAGNOSIS — G60.0 CMT (CHARCOT-MARIE-TOOTH DISEASE): ICD-10-CM

## 2025-07-30 DIAGNOSIS — I10 PRIMARY HYPERTENSION: ICD-10-CM

## 2025-07-30 DIAGNOSIS — E66.812 CLASS 2 SEVERE OBESITY DUE TO EXCESS CALORIES WITH SERIOUS COMORBIDITY AND BODY MASS INDEX (BMI) OF 35.0 TO 35.9 IN ADULT (HCC): ICD-10-CM

## 2025-07-30 DIAGNOSIS — G60.0 HEREDITARY MOTOR AND SENSORY NEUROPATHY: ICD-10-CM

## 2025-07-30 DIAGNOSIS — K21.9 GASTRO-ESOPHAGEAL REFLUX DISEASE WITHOUT ESOPHAGITIS: ICD-10-CM

## 2025-07-30 DIAGNOSIS — Z00.00 INITIAL MEDICARE ANNUAL WELLNESS VISIT: Primary | ICD-10-CM

## 2025-07-30 PROCEDURE — G0439 PPPS, SUBSEQ VISIT: HCPCS

## 2025-07-30 PROCEDURE — 3074F SYST BP LT 130 MM HG: CPT

## 2025-07-30 PROCEDURE — 99214 OFFICE O/P EST MOD 30 MIN: CPT

## 2025-07-30 PROCEDURE — 3078F DIAST BP <80 MM HG: CPT

## 2025-07-30 RX ORDER — FAMOTIDINE 20 MG/1
20 TABLET, FILM COATED ORAL 2 TIMES DAILY
Qty: 180 TABLET | Refills: 1 | Status: SHIPPED | OUTPATIENT
Start: 2025-07-30

## 2025-07-30 RX ORDER — LOSARTAN POTASSIUM 50 MG/1
50 TABLET ORAL DAILY
Qty: 90 TABLET | Refills: 1 | Status: SHIPPED | OUTPATIENT
Start: 2025-07-30

## 2025-07-30 ASSESSMENT — PATIENT HEALTH QUESTIONNAIRE - PHQ9
1. LITTLE INTEREST OR PLEASURE IN DOING THINGS: SEVERAL DAYS
SUM OF ALL RESPONSES TO PHQ QUESTIONS 1-9: 1
SUM OF ALL RESPONSES TO PHQ QUESTIONS 1-9: 1
2. FEELING DOWN, DEPRESSED OR HOPELESS: NOT AT ALL
SUM OF ALL RESPONSES TO PHQ QUESTIONS 1-9: 1
SUM OF ALL RESPONSES TO PHQ QUESTIONS 1-9: 1

## 2025-07-30 ASSESSMENT — LIFESTYLE VARIABLES
HOW MANY STANDARD DRINKS CONTAINING ALCOHOL DO YOU HAVE ON A TYPICAL DAY: PATIENT DOES NOT DRINK
HOW OFTEN DO YOU HAVE A DRINK CONTAINING ALCOHOL: NEVER

## 2025-07-30 NOTE — PROGRESS NOTES
Medicare Annual Wellness Visit    Mc Weber is here for Medicare AWV, Abdominal Cramping, and Dizziness    Assessment & Plan  1. Annual Medicare wellness visit: Stable.  - Visual acuity may be deteriorating, potentially increasing risk of falls. Diet is high in carbohydrates. Cognitive assessment revealed difficulty in recalling two words. Activity level has decreased, and exhibits exercise intolerance. Blood pressure readings are within the normal range today. Recent lab results from 05/2025 show no signs of anemia or infection, with an A1c level of 5.3, indicating neither diabetes nor prediabetes. Lipid panel is satisfactory, although HDL is slightly low at 28. Metabolic panel is normal, with a GFR >90, suggesting good kidney function. All liver enzymes are within normal limits, indicating no fatty liver disease. Glucose, calcium, albumin, potassium, sodium, and chloride levels are all normal.  - Undergo an eye exam this year.  - Referral to home health for physical therapy and occupational therapy to improve strength and mobility.  - Incorporate sources of good fats into diet, such as avocado, avocado oil, olive oil, fatty fish like salmon or cod, and nuts like almonds and cashews.    2. Hypertension: Stable.  - Currently taking losartan 50 mg for blood pressure management.  - Blood pressure readings are within the normal range today.  - Continue with losartan 50 mg.  - Prescription for losartan will be sent to pharmacy.    3. Gastroesophageal reflux disease (GERD): Chronic.  - Currently taking Pepcid (famotidine) once a day.  - Complaining of stomach pain, possibly due to long-term use of Mylanta.  - Increase dosage of Pepcid to twice a day.  - Discontinue Mylanta due to risk of constipation and potential GI bleed with long-term use.    4. Dental issues: Chronic.  - Multiple infections and requires an oral surgeon.  - Contact insurance company to arrange an appointment with an oral surgeon who accepts

## 2025-07-30 NOTE — PROGRESS NOTES
Room: 19   Provider: Coty    Patient consented to the use of Francis....  Yes    Mc Weber is a 40 y.o. male (: 1985) presenting to address:    Chief Complaint   Patient presents with    Medicare AWV    Abdominal Cramping    Dizziness       Vitals:    25 1359   BP: 112/68   Pulse: 95   Resp: 16   Temp: 98.3 °F (36.8 °C)   SpO2: 92%     Current Outpatient Medications on File Prior to Visit   Medication Sig Dispense Refill    Calcium Carbonate Antacid (TUMS PO) Take 500-1,000 mg by mouth 3 times daily as needed      losartan (COZAAR) 50 MG tablet Take 1 tablet by mouth daily      Multiple Vitamins-Minerals (MENS MULTIVITAMIN) CHEW Take 2 each by mouth daily      ibuprofen (ADVIL;MOTRIN) 200 MG tablet Take 1 tablet by mouth 2 times daily as needed (Patient not taking: Reported on 2025)      hydroCHLOROthiazide 12.5 MG capsule hydrochlorothiazide 12.5 mg capsule (Patient not taking: Reported on 6/10/2025)      famotidine (PEPCID) 20 MG tablet Take 1 tablet by mouth daily      aluminum & magnesium hydroxide-simethicone (LAURENCE-LANTA) 200-200-20 MG/5ML SUSP suspension Take 15 mLs by mouth as needed      Acetaminophen Extra Strength 500 MG TABS       triamcinolone (KENALOG) 0.1 % cream Apply topically daily as needed (for itchy rash)      nystatin (MYCOSTATIN) 096146 UNIT/GM powder Apply 3 times daily to areas with moisture associated dermatitis (skin folds, underarms). 60 g 3     No current facility-administered medications on file prior to visit.        \"Have you been to the ER, urgent care clinic since your last visit?  Hospitalized since your last visit?\"    NO    “Have you seen or consulted any other health care providers outside of [facility] since your last visit?”    NO             2025     2:12 PM   PHQ-9    Little interest or pleasure in doing things 1   Feeling down, depressed, or hopeless 0   PHQ-2 Score 1   PHQ-9 Total Score 1          2025     2:13 PM

## (undated) DEVICE — REM POLYHESIVE ADULT PATIENT RETURN ELECTRODE: Brand: VALLEYLAB

## (undated) DEVICE — SOL IRR NACL 0.9% 500ML POUR --

## (undated) DEVICE — 10FR FRAZIER SUCTION HANDLE: Brand: CARDINAL HEALTH

## (undated) DEVICE — KENDALL SCD EXPRESS SLEEVES, KNEE LENGTH, MEDIUM: Brand: KENDALL SCD

## (undated) DEVICE — SPONGE GZ W4XL4IN COT 12 PLY TYP VII WVN C FLD DSGN

## (undated) DEVICE — DEPAUL MINOR HEAD AND NECK: Brand: MEDLINE INDUSTRIES, INC.

## (undated) DEVICE — NEEDLE HYPO 25GA L1.5IN BLU POLYPR HUB S STL REG BVL STR

## (undated) DEVICE — SYRINGE MED 20ML STD CLR PLAS LUERLOCK TIP N CTRL DISP

## (undated) DEVICE — Device

## (undated) DEVICE — TAPE UMB 1/8X30IN MP COT WHT --

## (undated) DEVICE — MAGNETIC INSTRUMENT PAD 10" X 16"; MEDIUM; DISPOSABLE: Brand: CARDINAL HEALTH

## (undated) DEVICE — CATHETER IV 10GA L3IN OD3.3-3.5MM ID2.64-2.74MM BRN FEP

## (undated) DEVICE — NEEDLE HYPO 25GA L1.5IN BVL ORIENTED ECLIPSE

## (undated) DEVICE — INTENDED FOR TISSUE SEPARATION, AND OTHER PROCEDURES THAT REQUIRE A SHARP SURGICAL BLADE TO PUNCTURE OR CUT.: Brand: BARD-PARKER ® CARBON RIB-BACK BLADES

## (undated) DEVICE — STERILE LATEX POWDER-FREE SURGICAL GLOVESWITH NITRILE COATING: Brand: PROTEXIS

## (undated) DEVICE — 12FR FRAZIER SUCTION HANDLE: Brand: CARDINAL HEALTH

## (undated) DEVICE — STERILE POLYISOPRENE POWDER-FREE SURGICAL GLOVES: Brand: PROTEXIS

## (undated) DEVICE — SYR 10ML CTRL LR LCK NSAF LF --

## (undated) DEVICE — SUTURE VCRL SZ 3-0 L27IN ABSRB UD L26MM SH 1/2 CIR J416H

## (undated) DEVICE — PACKING GZ W2INXL6FT WVN COT VAG RADPQ

## (undated) DEVICE — MEDI-VAC SUCTION HANDLE REGULAR CAPACITY: Brand: CARDINAL HEALTH